# Patient Record
(demographics unavailable — no encounter records)

---

## 2024-12-02 NOTE — PHYSICAL EXAM
[No Acute Distress] : no acute distress [Well Nourished] : well nourished [Normal Sclera/Conjunctiva] : normal sclera/conjunctiva [PERRL] : pupils equal round and reactive to light [Normal Outer Ear/Nose] : the outer ears and nose were normal in appearance [Normal Oropharynx] : the oropharynx was normal [No JVD] : no jugular venous distention [No Lymphadenopathy] : no lymphadenopathy [Supple] : supple [Thyroid Normal, No Nodules] : the thyroid was normal and there were no nodules present [No Respiratory Distress] : no respiratory distress  [No Accessory Muscle Use] : no accessory muscle use [Clear to Auscultation] : lungs were clear to auscultation bilaterally [Normal Rate] : normal rate  [Regular Rhythm] : with a regular rhythm [Normal S1, S2] : normal S1 and S2 [I] : a grade 1 [No Carotid Bruits] : no carotid bruits [No Abdominal Bruit] : a ~M bruit was not heard ~T in the abdomen [No Varicosities] : no varicosities [Pedal Pulses Present] : the pedal pulses are present [No Palpable Aorta] : no palpable aorta [No Extremity Clubbing/Cyanosis] : no extremity clubbing/cyanosis [Normal Appearance] : normal in appearance [No Axillary Lymphadenopathy] : no axillary lymphadenopathy [Soft] : abdomen soft [Non Tender] : non-tender [Non-distended] : non-distended [No Masses] : no abdominal mass palpated [No HSM] : no HSM [Normal Bowel Sounds] : normal bowel sounds [Declined Rectal Exam] : declined rectal exam [Normal Posterior Cervical Nodes] : no posterior cervical lymphadenopathy [Normal Anterior Cervical Nodes] : no anterior cervical lymphadenopathy [No CVA Tenderness] : no CVA  tenderness [No Spinal Tenderness] : no spinal tenderness [No Joint Swelling] : no joint swelling [Grossly Normal Strength/Tone] : grossly normal strength/tone [No Rash] : no rash [Coordination Grossly Intact] : coordination grossly intact [No Focal Deficits] : no focal deficits [Normal Affect] : the affect was normal [Normal Insight/Judgement] : insight and judgment were intact [de-identified] : throat normal [de-identified] : trace edema L and right [de-identified] : ulnar deviation hands right worse, some proxinmal arthritis

## 2024-12-02 NOTE — ASSESSMENT
[FreeTextEntry1] : ? if pt has RA URI did not sleep well urinary burning gone BP not perfectly controlled on current amlodipine Hyperlipidemia on statin Nephrolithiasis

## 2024-12-02 NOTE — HISTORY OF PRESENT ILLNESS
[FreeTextEntry1] : Here to follow multiple ongoing conditions [de-identified] : 63-year-old male with multiple urologic issues nephrolithiasis status post aqua ablation and past lithotripsies with hyperlipidemia on statin, prediabetes not on medication and hypertension on amlodipine 5 mg nephrolithiasis on low oxalate diet new complaint of right hand arthritis like pain mostly in knuckles on exam there is some ulnar deviation also 2 days runny nose, affected his sleep last night, feels like he has a cold BPH sees urologists, mild incomplete bladder emptying last PVR 35 ml Recent prostate procedure benign prostate pathology Aquablation recent lithotripsies still urinary burning every time he goes overweight elevated BP on several readings started on amlodipine 2/28/24 now on 5 mg dose Hyperlipidemia on statin EST ok 1/27/2020

## 2024-12-02 NOTE — PHYSICAL EXAM
[No Acute Distress] : no acute distress [Well Nourished] : well nourished [Normal Sclera/Conjunctiva] : normal sclera/conjunctiva [PERRL] : pupils equal round and reactive to light [Normal Outer Ear/Nose] : the outer ears and nose were normal in appearance [Normal Oropharynx] : the oropharynx was normal [No JVD] : no jugular venous distention [No Lymphadenopathy] : no lymphadenopathy [Supple] : supple [Thyroid Normal, No Nodules] : the thyroid was normal and there were no nodules present [No Respiratory Distress] : no respiratory distress  [No Accessory Muscle Use] : no accessory muscle use [Clear to Auscultation] : lungs were clear to auscultation bilaterally [Normal Rate] : normal rate  [Regular Rhythm] : with a regular rhythm [Normal S1, S2] : normal S1 and S2 [I] : a grade 1 [No Carotid Bruits] : no carotid bruits [No Abdominal Bruit] : a ~M bruit was not heard ~T in the abdomen [No Varicosities] : no varicosities [Pedal Pulses Present] : the pedal pulses are present [No Palpable Aorta] : no palpable aorta [No Extremity Clubbing/Cyanosis] : no extremity clubbing/cyanosis [Normal Appearance] : normal in appearance [No Axillary Lymphadenopathy] : no axillary lymphadenopathy [Soft] : abdomen soft [Non Tender] : non-tender [Non-distended] : non-distended [No Masses] : no abdominal mass palpated [No HSM] : no HSM [Normal Bowel Sounds] : normal bowel sounds [Declined Rectal Exam] : declined rectal exam [Normal Posterior Cervical Nodes] : no posterior cervical lymphadenopathy [Normal Anterior Cervical Nodes] : no anterior cervical lymphadenopathy [No CVA Tenderness] : no CVA  tenderness [No Spinal Tenderness] : no spinal tenderness [No Joint Swelling] : no joint swelling [Grossly Normal Strength/Tone] : grossly normal strength/tone [No Rash] : no rash [Coordination Grossly Intact] : coordination grossly intact [No Focal Deficits] : no focal deficits [Normal Affect] : the affect was normal [Normal Insight/Judgement] : insight and judgment were intact [de-identified] : throat normal [de-identified] : trace edema L and right [de-identified] : ulnar deviation hands right worse, some proxinmal arthritis

## 2024-12-02 NOTE — HISTORY OF PRESENT ILLNESS
[FreeTextEntry1] : Here to follow multiple ongoing conditions [de-identified] : 63-year-old male with multiple urologic issues nephrolithiasis status post aqua ablation and past lithotripsies with hyperlipidemia on statin, prediabetes not on medication and hypertension on amlodipine 5 mg nephrolithiasis on low oxalate diet new complaint of right hand arthritis like pain mostly in knuckles on exam there is some ulnar deviation also 2 days runny nose, affected his sleep last night, feels like he has a cold BPH sees urologists, mild incomplete bladder emptying last PVR 35 ml Recent prostate procedure benign prostate pathology Aquablation recent lithotripsies still urinary burning every time he goes overweight elevated BP on several readings started on amlodipine 2/28/24 now on 5 mg dose Hyperlipidemia on statin EST ok 1/27/2020

## 2024-12-02 NOTE — PLAN
[FreeTextEntry1] : hand x rays ? rheum referral also check bloods Mucinex DM, flu panel f/u BP 3 months check bloods, adjust meds as needed flu panel

## 2025-01-25 NOTE — HISTORY OF PRESENT ILLNESS
[FreeTextEntry1] : 63y/o man  (1) BPH  hx of ThuLEP on 6/13/24 pathology 29 grams, benign saw Dr. Mendoza for ED evaluation  At last visit: mild stress incontinence  using one under shield per day--moist at the end of the day Kegel exercises were started Now:   Uroflow  voided volume   peak flow   PVR- ml  (2) kidney and bladder stones: History of ESWL x3-4 times (last time in 2020), Cystolitholapaxy Feb 2024. Stone analysis: 80% CaOx, 20% Brushite Reviewed operative report from March 2024--- on the right side stones were treated CT scan [facility] radiology February 2024 shows 6 mm left midpole stone and 1.2 cm left lower pole stone  (3) PSA screening 0.37 Sept 2024 0.35 Jul 2024 1.98 Apr 2024 1.88 Mar 2023 1.54 Feb 2021

## 2025-01-25 NOTE — ASSESSMENT
[FreeTextEntry1] : (1)  BPH  Kegel exercises   PSA next visit Uroflow and PVR next visit  (2) Kidney stones: 24 hr urine ordered to be done before next visit Renal sono ordered to be done at or before next visit  Options discussed for kidney stones: LEFT ureteroscopy laser lithotripsy stent insertion. Risks, benefits and alternatives discussed. Risk of infection, multiple procedures, ureteral injury, ureteral stricture, incomplete stones clearance, sepsis, bleeding, retention, all discussed. Also discussed the possible need for a temporary ureteral stent. The possible side effects of a temporary ureteral stent, including flank pain, hematuria, and bladder spasms, bladder pressure, small volume voids, dysuria. The patient understands that a stent is a temporary implant that must be removed in the future.

## 2025-03-01 NOTE — HISTORY OF PRESENT ILLNESS
[FreeTextEntry1] : 63y/o man  1. BPH Mild mixed incontinence using one under shield per day--just moist at the end of the day  s/p ThuLEP on 6/13/24 pathology 29 grams, benign  Uroflow  volume-  ml peak-  cc/sec  PVR   PSA 0.37 Sept 2024  2. Kidney stones History of ESWL x3-4 times (last time in 2020), Cystolitholapaxy Feb 2024. Stone analysis: 80% CaOx, 20% Brushite  Reviewed operative report from March 2024--- only the right side stones were treated CT scan [facility] radiology February 2024 shows 6 mm left midpole stone and 1.2 cm left lower pole stone Renal ultrasound [facility] Radiology Feb 2025: left lower pole stones 1.4 cm and 8mm  2x24 hr urines: low vol, high sodium, high UUN/PCR, citrate 530, pH over 6, high SSCaOx and CaPhos, mild high calcium

## 2025-03-01 NOTE — ASSESSMENT
[FreeTextEntry1] : (1)  BPH  Do Kegel exercises consistently Printed guide given to patient  PSA yearly Uroflow and PVR next visit  (2) Kidney stones: 24 hr urine ordered to be done before next visit Renal ultrasound ordered to be done at or before next visit  Options for Left renal stones discussed LEFT ureteroscopy laser lithotripsy stent insertion. Risks, benefits and alternatives discussed. Risk of infection, multiple procedures, ureteral injury, ureteral stricture, incomplete stones clearance, sepsis, bleeding, retention, all discussed. Also discussed the possible need for a temporary ureteral stent. The possible side effects of a temporary ureteral stent, including flank pain, hematuria, and bladder spasms, bladder pressure, small volume voids, dysuria. The patient understands that a stent is a temporary implant that must be removed in the future.   4 months follow up

## 2025-03-01 NOTE — HISTORY OF PRESENT ILLNESS
[FreeTextEntry1] : 63y/o man  1. BPH Mild mixed incontinence using one under shield per day--just moist at the end of the day  s/p ThuLEP on 6/13/24 pathology 29 grams, benign  Uroflow  volume-  ml peak-  cc/sec  PVR   PSA 0.37 Sept 2024  2. Kidney stones History of ESWL x3-4 times (last time in 2020), Cystolitholapaxy Feb 2024. Stone analysis: 80% CaOx, 20% Brushite  Reviewed operative report from March 2024--- only the right side stones were treated CT scan [facility] radiology February 2024 shows 6 mm left midpole stone and 1.2 cm left lower pole stone Renal ultrasound [facility] Radiology Feb 2025: left lower pole stones 1.4 cm and 8mm  2x24 hr urines: low vol, high sodium, high UUN/PCR, citrate 530, pH over 6, high SSCaOx and CaPhos, mild high calcium

## 2025-04-11 NOTE — PHYSICAL EXAM
[No Acute Distress] : no acute distress [Well Nourished] : well nourished [Normal Sclera/Conjunctiva] : normal sclera/conjunctiva [PERRL] : pupils equal round and reactive to light [Normal Outer Ear/Nose] : the outer ears and nose were normal in appearance [Normal Oropharynx] : the oropharynx was normal [No JVD] : no jugular venous distention [No Lymphadenopathy] : no lymphadenopathy [Supple] : supple [Thyroid Normal, No Nodules] : the thyroid was normal and there were no nodules present [No Respiratory Distress] : no respiratory distress  [No Accessory Muscle Use] : no accessory muscle use [Clear to Auscultation] : lungs were clear to auscultation bilaterally [Normal Rate] : normal rate  [Regular Rhythm] : with a regular rhythm [Normal S1, S2] : normal S1 and S2 [I] : a grade 1 [No Carotid Bruits] : no carotid bruits [No Abdominal Bruit] : a ~M bruit was not heard ~T in the abdomen [No Varicosities] : no varicosities [Pedal Pulses Present] : the pedal pulses are present [No Palpable Aorta] : no palpable aorta [No Extremity Clubbing/Cyanosis] : no extremity clubbing/cyanosis [Normal Appearance] : normal in appearance [No Axillary Lymphadenopathy] : no axillary lymphadenopathy [Soft] : abdomen soft [Non Tender] : non-tender [Non-distended] : non-distended [No Masses] : no abdominal mass palpated [No HSM] : no HSM [Normal Bowel Sounds] : normal bowel sounds [Declined Rectal Exam] : declined rectal exam [Normal Posterior Cervical Nodes] : no posterior cervical lymphadenopathy [Normal Anterior Cervical Nodes] : no anterior cervical lymphadenopathy [No CVA Tenderness] : no CVA  tenderness [No Spinal Tenderness] : no spinal tenderness [No Joint Swelling] : no joint swelling [Grossly Normal Strength/Tone] : grossly normal strength/tone [No Rash] : no rash [Coordination Grossly Intact] : coordination grossly intact [No Focal Deficits] : no focal deficits [Normal Affect] : the affect was normal [Normal Insight/Judgement] : insight and judgment were intact [de-identified] : trace edema L and right

## 2025-04-11 NOTE — PHYSICAL EXAM
[No Acute Distress] : no acute distress [Well Nourished] : well nourished [Normal Sclera/Conjunctiva] : normal sclera/conjunctiva [PERRL] : pupils equal round and reactive to light [Normal Outer Ear/Nose] : the outer ears and nose were normal in appearance [Normal Oropharynx] : the oropharynx was normal [No JVD] : no jugular venous distention [No Lymphadenopathy] : no lymphadenopathy [Supple] : supple [Thyroid Normal, No Nodules] : the thyroid was normal and there were no nodules present [No Respiratory Distress] : no respiratory distress  [No Accessory Muscle Use] : no accessory muscle use [Clear to Auscultation] : lungs were clear to auscultation bilaterally [Normal Rate] : normal rate  [Regular Rhythm] : with a regular rhythm [Normal S1, S2] : normal S1 and S2 [I] : a grade 1 [No Carotid Bruits] : no carotid bruits [No Abdominal Bruit] : a ~M bruit was not heard ~T in the abdomen [No Varicosities] : no varicosities [Pedal Pulses Present] : the pedal pulses are present [No Palpable Aorta] : no palpable aorta [No Extremity Clubbing/Cyanosis] : no extremity clubbing/cyanosis [Normal Appearance] : normal in appearance [No Axillary Lymphadenopathy] : no axillary lymphadenopathy [Soft] : abdomen soft [Non Tender] : non-tender [Non-distended] : non-distended [No Masses] : no abdominal mass palpated [No HSM] : no HSM [Normal Bowel Sounds] : normal bowel sounds [Declined Rectal Exam] : declined rectal exam [Normal Posterior Cervical Nodes] : no posterior cervical lymphadenopathy [Normal Anterior Cervical Nodes] : no anterior cervical lymphadenopathy [No CVA Tenderness] : no CVA  tenderness [No Spinal Tenderness] : no spinal tenderness [No Joint Swelling] : no joint swelling [Grossly Normal Strength/Tone] : grossly normal strength/tone [No Rash] : no rash [Coordination Grossly Intact] : coordination grossly intact [No Focal Deficits] : no focal deficits [Normal Affect] : the affect was normal [Normal Insight/Judgement] : insight and judgment were intact [de-identified] : trace edema L and right

## 2025-04-11 NOTE — ASSESSMENT
[Patient Optimized for Surgery] : Patient optimized for surgery [No Further Testing Recommended] : no further testing recommended [Continue medications as is] : Continue current medications [FreeTextEntry4] : stable for surgery and anesthesia at low risk blood pressure ok today on current meds

## 2025-04-11 NOTE — HISTORY OF PRESENT ILLNESS
[(Patient denies any chest pain, claudication, dyspnea on exertion, orthopnea, palpitations or syncope)] : Patient denies any chest pain, claudication, dyspnea on exertion, orthopnea, palpitations or syncope [Excellent (>10 METs)] : Excellent (>10 METs) [Aortic Stenosis] : no aortic stenosis [Atrial Fibrillation] : no atrial fibrillation [Coronary Artery Disease] : no coronary artery disease [Recent Myocardial Infarction] : no recent myocardial infarction [Implantable Device/Pacemaker] : no implantable device/pacemaker [Asthma] : no asthma [COPD] : no COPD [Sleep Apnea] : no sleep apnea [Family Member] : no family member with adverse anesthesia reaction/sudden death [Self] : no previous adverse anesthesia reaction [Chronic Anticoagulation] : no chronic anticoagulation [Chronic Kidney Disease] : no chronic kidney disease [Diabetes] : no diabetes [FreeTextEntry1] : laser lithotripsy [FreeTextEntry2] : 4/18/25 [FreeTextEntry3] : Dr Caraballo [FreeTextEntry4] : 64-year-old male history of hypertension on amlodipine hyperlipidemia on rosuvastatin BPH and kidney stones here for preoperative clearance Status post prostate laser procedure June 2024 benign pathology Status post laser lithotripsy bladder March 5, 2024 nephrolithiasis on low oxalate diet BPH sees urologist  bladder stone, had hematuria - sono hydronephrosis still with hydronephrosis sometimes with anxiety , worries about things, asking for something for it EST ok 1/27/2020 [FreeTextEntry5] : former light smoker quit 40 years ago

## 2025-04-11 NOTE — RESULTS/DATA
[] : results reviewed [de-identified] : ok [de-identified] : ok [de-identified] : normal 4/10/2025 [de-identified] : urine culture negative

## 2025-04-11 NOTE — RESULTS/DATA
[] : results reviewed [de-identified] : ok [de-identified] : ok [de-identified] : normal 4/10/2025 [de-identified] : urine culture negative

## 2025-04-25 NOTE — HISTORY OF PRESENT ILLNESS
[None] : None [FreeTextEntry1] : 63y/o man Follow up with kidney stones, BPH  Kidney stones s/p left URS with CVAC and stent placement on 4/18/25 with Dr. Dawson  stone- pending  HX History of ESWL x3-4 times (last time in 2020), Cystolitholapaxy Feb 2024. Stone analysis: 80% CaOx, 20% Brushite  [Dysuria] : no dysuria [Hematuria - Gross] : no gross hematuria

## 2025-04-25 NOTE — PHYSICAL EXAM
[Chaperone Present] : A chaperone was present in the examining room during all aspects of the physical examination [General Appearance - Well Developed] : well developed [] : no respiratory distress [Urethral Meatus] : meatus normal [Skin Color & Pigmentation] : normal skin color and pigmentation [Oriented To Time, Place, And Person] : oriented to person, place, and time [FreeTextEntry2] : Fern Menezes MA

## 2025-04-25 NOTE — ASSESSMENT
[FreeTextEntry1] : Ureteral stent removed intact and in it's entirety using attached string tether.  Pt tolerated well. Post stent removal pain/colic cautions advised.  dietary counseling Increase fluid intake Low sodium and animal protein diet  plan  24 hr urine before next visit  Renal sono at or before next visit  Follow up in 2 months

## 2025-07-11 NOTE — HISTORY OF PRESENT ILLNESS
[FreeTextEntry1] : 65y/o man  1. BPH Mild mixed incontinence using one under shield per day--just moist at the end of the day  s/p ThuLEP on 6/13/24 pathology 29 grams, benign  Uroflow  volume-  ml peak-  cc/sec  PVR   PSA 0.37 Sept 2024  2. Kidney stones History of ESWL x3-4 times (last time in 2020), Cystolitholapaxy Feb 2024. Stone analysis: 80% CaOx, 20% Brushite  Reviewed operative report from March 2024--- only the right side stones were treated CT scan [facility] radiology February 2024 shows 6 mm left midpole stone and 1.2 cm left lower pole stone Renal ultrasound [facility] Radiology Feb 2025: left lower pole stones 1.4 cm and 8mm  2x24 hr urines: low vol, high sodium, high UUN/PCR, citrate 530, pH over 6, high SSCaOx and CaPhos, mild high calcium

## 2025-07-11 NOTE — HISTORY OF PRESENT ILLNESS
[FreeTextEntry1] : 63y/o man  1. BPH Mild mixed incontinence using one under shield per day--just moist at the end of the day  s/p ThuLEP on 6/13/24 pathology 29 grams, benign  Uroflow  volume-  ml peak-  cc/sec  PVR   PSA 0.37 Sept 2024  2. Kidney stones History of ESWL x3-4 times (last time in 2020), Cystolitholapaxy Feb 2024. Stone analysis: 80% CaOx, 20% Brushite  Reviewed operative report from March 2024--- only the right side stones were treated CT scan [facility] radiology February 2024 shows 6 mm left midpole stone and 1.2 cm left lower pole stone Renal ultrasound [facility] Radiology Feb 2025: left lower pole stones 1.4 cm and 8mm  2x24 hr urines: low vol, high sodium, high UUN/PCR, citrate 530, pH over 6, high SSCaOx and CaPhos, mild high calcium

## 2025-07-29 NOTE — PLAN
[FreeTextEntry1] : start telmisartan diet and exercise f/u 3 months triamcinolone for rash Check bloods

## 2025-07-29 NOTE — CURRENT MEDS
[Takes medication as prescribed] : takes [None] : Patient does not have any barriers to medication adherence [Benzodiazepines] : benzodiazepines [FreeTextEntry1] : Almost never uses alprazolam just likes to have it

## 2025-07-29 NOTE — HISTORY OF PRESENT ILLNESS
[FreeTextEntry1] : Here for complete exam and to follow multiple ongoing conditions [de-identified] : 64-year-old male with multiple urologic issues nephrolithiasis status post aqua ablation and past lithotripsies with hyperlipidemia on statin, prediabetes not on medication and hypertension on amlodipine 5 mg nephrolithiasis on low oxalate diet  BPH sees urologists, mild incomplete bladder emptying last PVR 35 ml Recent prostate procedure benign prostate pathology Aquablation recent lithotripsies still urinary burning every time he goes overweight elevated BP on several readings started on amlodipine 2/28/24 now on 5 mg dose Hyperlipidemia on statin EST ok 1/27/2020

## 2025-07-29 NOTE — HEALTH RISK ASSESSMENT
[Very Good] : ~his/her~  mood as very good [Yes] : Yes [2 - 3 times a week (3 pts)] : 2 - 3  times a week (3 points) [1 or 2 (0 pts)] : 1 or 2 (0 points) [Never (0 pts)] : Never (0 points) [No falls in past year] : Patient reported no falls in the past year [0] : 2) Feeling down, depressed, or hopeless: Not at all (0) [PHQ-2 Negative - No further assessment needed] : PHQ-2 Negative - No further assessment needed [Former] : Former [0-4] : 0-4 [> 15 Years] : > 15 Years [NO] : No [Patient reported colonoscopy was abnormal] : Patient reported colonoscopy was abnormal [Fully functional (bathing, dressing, toileting, transferring, walking, feeding)] : Fully functional (bathing, dressing, toileting, transferring, walking, feeding) [Fully functional (using the telephone, shopping, preparing meals, housekeeping, doing laundry, using] : Fully functional and needs no help or supervision to perform IADLs (using the telephone, shopping, preparing meals, housekeeping, doing laundry, using transportation, managing medications and managing finances) [[id number]] : 0 [Change in mental status noted] : No change in mental status noted [Language] : denies difficulty with language [Behavior] : denies difficulty with behavior [Learning/Retaining New Information] : denies difficulty learning/retaining new information [Handling Complex Tasks] : denies difficulty handling complex tasks [Reasoning] : denies difficulty with reasoning [Spatial Ability and Orientation] : denies difficulty with spatial ability and orientation [High Risk Behavior] : no high risk behavior [ColonoscopyDate] : 3/2021 [ColonoscopyComments] : benign hyperplastic polyp

## 2025-07-29 NOTE — PHYSICAL EXAM
[No Acute Distress] : no acute distress [Well Nourished] : well nourished [Normal Sclera/Conjunctiva] : normal sclera/conjunctiva [PERRL] : pupils equal round and reactive to light [Normal Outer Ear/Nose] : the outer ears and nose were normal in appearance [Normal Oropharynx] : the oropharynx was normal [No JVD] : no jugular venous distention [No Lymphadenopathy] : no lymphadenopathy [Supple] : supple [Thyroid Normal, No Nodules] : the thyroid was normal and there were no nodules present [No Respiratory Distress] : no respiratory distress  [No Accessory Muscle Use] : no accessory muscle use [Clear to Auscultation] : lungs were clear to auscultation bilaterally [Normal Rate] : normal rate  [Regular Rhythm] : with a regular rhythm [Normal S1, S2] : normal S1 and S2 [I] : a grade 1 [No Carotid Bruits] : no carotid bruits [No Abdominal Bruit] : a ~M bruit was not heard ~T in the abdomen [No Varicosities] : no varicosities [Pedal Pulses Present] : the pedal pulses are present [No Palpable Aorta] : no palpable aorta [No Extremity Clubbing/Cyanosis] : no extremity clubbing/cyanosis [Normal Appearance] : normal in appearance [No Axillary Lymphadenopathy] : no axillary lymphadenopathy [Soft] : abdomen soft [Non Tender] : non-tender [Non-distended] : non-distended [No Masses] : no abdominal mass palpated [No HSM] : no HSM [Normal Bowel Sounds] : normal bowel sounds [Declined Rectal Exam] : declined rectal exam [Normal Supraclavicular Nodes] : no supraclavicular lymphadenopathy [Normal Axillary Nodes] : no axillary lymphadenopathy [Normal Posterior Cervical Nodes] : no posterior cervical lymphadenopathy [Normal Anterior Cervical Nodes] : no anterior cervical lymphadenopathy [No CVA Tenderness] : no CVA  tenderness [No Spinal Tenderness] : no spinal tenderness [No Joint Swelling] : no joint swelling [Grossly Normal Strength/Tone] : grossly normal strength/tone [Coordination Grossly Intact] : coordination grossly intact [No Focal Deficits] : no focal deficits [Normal Affect] : the affect was normal [Normal Insight/Judgement] : insight and judgment were intact [de-identified] : trace edema L and right [de-identified] : venous stasis hyperpigmentation

## 2025-07-29 NOTE — ASSESSMENT
[Vaccines Reviewed] : Immunizations reviewed today. Please see immunization details in the vaccine log within the immunization flowsheet.  [FreeTextEntry1] : BP mildly elevated on current amlodipine Hyperlipidemia on statin Nephrolithiasis venous stasis dermatitis